# Patient Record
Sex: FEMALE | Race: WHITE | NOT HISPANIC OR LATINO
[De-identification: names, ages, dates, MRNs, and addresses within clinical notes are randomized per-mention and may not be internally consistent; named-entity substitution may affect disease eponyms.]

---

## 2019-01-17 ENCOUNTER — APPOINTMENT (OUTPATIENT)
Dept: ULTRASOUND IMAGING | Facility: CLINIC | Age: 79
End: 2019-01-17
Payer: MEDICARE

## 2019-01-17 ENCOUNTER — OUTPATIENT (OUTPATIENT)
Dept: OUTPATIENT SERVICES | Facility: HOSPITAL | Age: 79
LOS: 1 days | End: 2019-01-17
Payer: MEDICARE

## 2019-01-17 DIAGNOSIS — Z00.8 ENCOUNTER FOR OTHER GENERAL EXAMINATION: ICD-10-CM

## 2019-01-17 PROCEDURE — 93971 EXTREMITY STUDY: CPT | Mod: 26

## 2019-01-17 PROCEDURE — 93971 EXTREMITY STUDY: CPT

## 2019-12-06 ENCOUNTER — APPOINTMENT (OUTPATIENT)
Dept: OTOLARYNGOLOGY | Facility: CLINIC | Age: 79
End: 2019-12-06
Payer: MEDICARE

## 2019-12-06 VITALS
HEART RATE: 84 BPM | HEIGHT: 62.5 IN | SYSTOLIC BLOOD PRESSURE: 179 MMHG | BODY MASS INDEX: 34.99 KG/M2 | WEIGHT: 195 LBS | DIASTOLIC BLOOD PRESSURE: 99 MMHG

## 2019-12-06 DIAGNOSIS — E66.9 OBESITY, UNSPECIFIED: ICD-10-CM

## 2019-12-06 DIAGNOSIS — R42 DIZZINESS AND GIDDINESS: ICD-10-CM

## 2019-12-06 PROCEDURE — 69210 REMOVE IMPACTED EAR WAX UNI: CPT

## 2019-12-06 PROCEDURE — 99203 OFFICE O/P NEW LOW 30 MIN: CPT | Mod: 25

## 2019-12-06 NOTE — HISTORY OF PRESENT ILLNESS
[No] : patient does not have a  history of radiation therapy [de-identified] : 80 yo female\par chr h/o bilat hearing loss and tinnitus R>L x yrs- moderate\par also has past h/o vertigo\par no nasal or throat complaints\par no other modifying factors\par  [Anxiety] : no anxiety [Tinnitus] : tinnitus [Dizziness] : no dizziness [Headache] : no headache [Hearing Loss] : hearing loss [Vertigo] : no vertigo [Otalgia] : no otalgia [Eustachian Tube Dysfunction] : no eustachian tube dysfunction [Presbycusis] : presbycusis [Loud Noise Exposure] : no history of loud noise exposure [Stroke] : no stroke [Smoking] : no smoking [Early Onset Hearing Loss] : no early onset hearing loss [Facial Pain] : no facial pain [Facial Pressure] : no facial pressure [Nasal Congestion] : no nasal congestion [Ear Fullness] : no ear fullness [Allergic Rhinitis] : no allergic rhinitis [Septal Deviation] : no septal deviation [Environmental Allergies] : no environmental allergies [Seasonal Allergies] : no seasonal allergies [Environmental Allergens] : no environmental allergens [Allergies] : no allergies [Asthma] : no asthma [Neck Mass] : no neck mass [Neck Pain] : no neck pain [Chills] : no chills [Cold Intolerance] : no cold intolerance [Cough] : no cough [Fatigue] : no fatigue [Heat Intolerance] : no heat intolerance [Hyperthyroidism] : no hyperthyroidism [Sialadenitis] : no sialadenitis [Hodgkin Disease] : no hodgkin disease [Non-Hodgkin Lymphoma] : no non-hodgkin lymphoma [Graves Disease] : no graves disease [Alcohol Use] : no alcohol use [Tobacco Use] : no tobacco use [Thyroid Cancer] : no thyroid cancer

## 2019-12-06 NOTE — PHYSICAL EXAM
[de-identified] : bilat wax [] : septum deviated bilaterally [Midline] : trachea located in midline position [Normal] : salivary glands are normal

## 2019-12-06 NOTE — ASSESSMENT
[FreeTextEntry1] : wax-\par After informed verbal consent is obtained, cerumen is removed from the right and left  ear canal with a curette and suction.\par Rx: \par Debrox was prescribed and  is to be placed in both ears on a routine basis to keep them free of wax.\par Routine debridement suggested to keep the ears free of wax.\par \par bilateral sensorineural hearing loss-cleared for hearing aids\par

## 2020-06-18 ENCOUNTER — APPOINTMENT (OUTPATIENT)
Dept: OTOLARYNGOLOGY | Facility: CLINIC | Age: 80
End: 2020-06-18
Payer: MEDICARE

## 2020-06-18 VITALS
DIASTOLIC BLOOD PRESSURE: 94 MMHG | SYSTOLIC BLOOD PRESSURE: 174 MMHG | TEMPERATURE: 97.8 F | WEIGHT: 195 LBS | HEIGHT: 62.5 IN | HEART RATE: 92 BPM | BODY MASS INDEX: 34.99 KG/M2

## 2020-06-18 PROCEDURE — 99214 OFFICE O/P EST MOD 30 MIN: CPT | Mod: 25

## 2020-06-18 PROCEDURE — 69210 REMOVE IMPACTED EAR WAX UNI: CPT

## 2020-06-18 NOTE — ASSESSMENT
[FreeTextEntry1] : wax-\par After informed verbal consent is obtained, cerumen is removed from the right and left  ear canal with a curette and suction.\par Rx: \par Debrox was prescribed and  is to be placed in both ears on a routine basis to keep them free of wax.\par Routine debridement suggested to keep the ears free of wax.\par \par bilateral sensorineural hearing loss-cleared for hearing aids\par tinnitus resolved w/ wax removal\par \par f/u 6 months or prn

## 2020-06-18 NOTE — REASON FOR VISIT
[Hearing Loss] : hearing loss [Vertigo] : vertigo [Subsequent Evaluation] : a subsequent evaluation for

## 2020-06-18 NOTE — PHYSICAL EXAM
[] : septum deviated bilaterally [Midline] : trachea located in midline position [de-identified] : bilat wax [Normal] : no rashes

## 2020-06-18 NOTE — HISTORY OF PRESENT ILLNESS
[No] : patient does not have a  history of radiation therapy [Tinnitus] : tinnitus [Hearing Loss] : hearing loss [Presbycusis] : presbycusis [de-identified] : 78 yo female with chronic bilateral hearing loss and tinnitus R>L x years presents for routine wax cleaning. No change in hearing. Has hx of vertigo. \par Pt has no ear pain, ear drainage, vertigo, nasal congestion, nasal discharge, epistaxis, sinus infections, facial pain, facial pressure, throat pain, dysphagia or fevers\par \par \par  [Anxiety] : no anxiety [Dizziness] : no dizziness [Otalgia] : no otalgia [Headache] : no headache [Vertigo] : no vertigo [Loud Noise Exposure] : no history of loud noise exposure [Eustachian Tube Dysfunction] : no eustachian tube dysfunction [Smoking] : no smoking [Stroke] : no stroke [Early Onset Hearing Loss] : no early onset hearing loss [Facial Pressure] : no facial pressure [Nasal Congestion] : no nasal congestion [Facial Pain] : no facial pain [Ear Fullness] : no ear fullness [Allergic Rhinitis] : no allergic rhinitis [Septal Deviation] : no septal deviation [Environmental Allergies] : no environmental allergies [Environmental Allergens] : no environmental allergens [Seasonal Allergies] : no seasonal allergies [Allergies] : no allergies [Asthma] : no asthma [Neck Mass] : no neck mass [Neck Pain] : no neck pain [Chills] : no chills [Fatigue] : no fatigue [Cough] : no cough [Cold Intolerance] : no cold intolerance [Hyperthyroidism] : no hyperthyroidism [Heat Intolerance] : no heat intolerance [Hodgkin Disease] : no hodgkin disease [Sialadenitis] : no sialadenitis [Non-Hodgkin Lymphoma] : no non-hodgkin lymphoma [Tobacco Use] : no tobacco use [Alcohol Use] : no alcohol use [Graves Disease] : no graves disease [Thyroid Cancer] : no thyroid cancer

## 2022-03-11 DIAGNOSIS — Z01.812 ENCOUNTER FOR PREPROCEDURAL LABORATORY EXAMINATION: ICD-10-CM

## 2022-03-25 LAB — SARS-COV-2 N GENE NPH QL NAA+PROBE: NOT DETECTED

## 2022-03-29 ENCOUNTER — APPOINTMENT (OUTPATIENT)
Dept: PULMONOLOGY | Facility: CLINIC | Age: 82
End: 2022-03-29
Payer: MEDICARE

## 2022-03-29 VITALS
DIASTOLIC BLOOD PRESSURE: 81 MMHG | RESPIRATION RATE: 16 BRPM | TEMPERATURE: 98.1 F | HEIGHT: 62 IN | HEART RATE: 90 BPM | BODY MASS INDEX: 36.25 KG/M2 | WEIGHT: 197 LBS | SYSTOLIC BLOOD PRESSURE: 159 MMHG | OXYGEN SATURATION: 96 %

## 2022-03-29 DIAGNOSIS — J45.909 UNSPECIFIED ASTHMA, UNCOMPLICATED: ICD-10-CM

## 2022-03-29 DIAGNOSIS — R05.9 COUGH, UNSPECIFIED: ICD-10-CM

## 2022-03-29 LAB — POCT - HEMOGLOBIN (HGB), QUANTITATIVE, TRANSCUTANEOUS: 12.5

## 2022-03-29 PROCEDURE — 71046 X-RAY EXAM CHEST 2 VIEWS: CPT

## 2022-03-29 PROCEDURE — 99204 OFFICE O/P NEW MOD 45 MIN: CPT | Mod: 25

## 2022-03-29 PROCEDURE — 94729 DIFFUSING CAPACITY: CPT

## 2022-03-29 PROCEDURE — 94727 GAS DIL/WSHOT DETER LNG VOL: CPT

## 2022-03-29 PROCEDURE — ZZZZZ: CPT

## 2022-03-29 PROCEDURE — 94060 EVALUATION OF WHEEZING: CPT

## 2022-03-29 PROCEDURE — 88738 HGB QUANT TRANSCUTANEOUS: CPT

## 2022-03-29 RX ORDER — ALBUTEROL SULFATE 90 UG/1
108 (90 BASE) POWDER, METERED RESPIRATORY (INHALATION) EVERY 6 HOURS
Qty: 1 | Refills: 3 | Status: ACTIVE | COMMUNITY
Start: 2022-03-29 | End: 1900-01-01

## 2022-03-29 RX ORDER — AMLODIPINE BESYLATE 5 MG/1
TABLET ORAL
Refills: 0 | Status: ACTIVE | COMMUNITY

## 2022-03-29 RX ORDER — ATORVASTATIN CALCIUM 80 MG/1
TABLET, FILM COATED ORAL
Refills: 0 | Status: ACTIVE | COMMUNITY

## 2022-03-29 RX ORDER — BECLOMETHASONE DIPROPIONATE HFA 40 UG/1
40 AEROSOL, METERED RESPIRATORY (INHALATION)
Qty: 1 | Refills: 5 | Status: ACTIVE | COMMUNITY
Start: 2022-03-29 | End: 1900-01-01

## 2022-03-29 RX ORDER — BECLOMETHASONE DIPROPIONATE 40 UG/1
40 AEROSOL, METERED RESPIRATORY (INHALATION)
Refills: 0 | Status: ACTIVE | COMMUNITY

## 2022-03-29 RX ORDER — AZILSARTAN KAMEDOXOMIL 40 MG/1
40 TABLET ORAL
Refills: 0 | Status: ACTIVE | COMMUNITY

## 2022-03-29 NOTE — HISTORY OF PRESENT ILLNESS
[Former] : former [Never] : never [TextBox_4] : Referred for evaluation of cough\par Worsening cough past several months did not change after blood pressure medication change history of asthma currently on Qvar does report some nasal drip symptoms reports chronic poor sleep with chronic insomnia.  Denies snoring.  Does have history of frequent UTIs and often takes nitrofurantoin but does not associate cough with this medication\par \par \par \par \par \par \par \par  [YearQuit] : 1970

## 2022-03-29 NOTE — PROCEDURE
[FreeTextEntry1] : PFTs demonstrate obstructive pattern nonspecific bronchodilator response normal to increased lung volumes normal diffusing capacity chest x-ray clear\par \par

## 2022-03-29 NOTE — ASSESSMENT
[FreeTextEntry1] : Cough appears to be related to undertreated asthma.  While her PFT is normal her NIOX is elevated she is only taking a minimal dose of Qvar increased Qvar dose to 2 puffs twice daily and prescribed rescue inhaler.  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

## 2022-04-04 ENCOUNTER — APPOINTMENT (OUTPATIENT)
Dept: MAMMOGRAPHY | Facility: CLINIC | Age: 82
End: 2022-04-04
Payer: MEDICARE

## 2022-04-04 PROCEDURE — 77063 BREAST TOMOSYNTHESIS BI: CPT

## 2022-04-04 PROCEDURE — 77067 SCR MAMMO BI INCL CAD: CPT

## 2022-05-10 ENCOUNTER — APPOINTMENT (OUTPATIENT)
Dept: PULMONOLOGY | Facility: CLINIC | Age: 82
End: 2022-05-10

## 2022-06-02 ENCOUNTER — OUTPATIENT (OUTPATIENT)
Dept: OUTPATIENT SERVICES | Facility: HOSPITAL | Age: 82
LOS: 1 days | End: 2022-06-02
Payer: MEDICARE

## 2022-06-02 ENCOUNTER — APPOINTMENT (OUTPATIENT)
Dept: ULTRASOUND IMAGING | Facility: IMAGING CENTER | Age: 82
End: 2022-06-02
Payer: MEDICARE

## 2022-06-02 DIAGNOSIS — Z00.8 ENCOUNTER FOR OTHER GENERAL EXAMINATION: ICD-10-CM

## 2022-06-02 PROCEDURE — 93971 EXTREMITY STUDY: CPT

## 2022-06-02 PROCEDURE — 93971 EXTREMITY STUDY: CPT | Mod: 26,RT

## 2022-09-12 ENCOUNTER — APPOINTMENT (OUTPATIENT)
Dept: OTOLARYNGOLOGY | Facility: CLINIC | Age: 82
End: 2022-09-12

## 2022-09-12 VITALS
WEIGHT: 195 LBS | BODY MASS INDEX: 35.88 KG/M2 | SYSTOLIC BLOOD PRESSURE: 147 MMHG | DIASTOLIC BLOOD PRESSURE: 86 MMHG | HEART RATE: 80 BPM | TEMPERATURE: 97.5 F | HEIGHT: 62 IN

## 2022-09-12 DIAGNOSIS — H61.23 IMPACTED CERUMEN, BILATERAL: ICD-10-CM

## 2022-09-12 DIAGNOSIS — H90.3 SENSORINEURAL HEARING LOSS, BILATERAL: ICD-10-CM

## 2022-09-12 DIAGNOSIS — H93.11 TINNITUS, RIGHT EAR: ICD-10-CM

## 2022-09-12 PROCEDURE — 69210 REMOVE IMPACTED EAR WAX UNI: CPT

## 2022-09-12 PROCEDURE — 99213 OFFICE O/P EST LOW 20 MIN: CPT | Mod: 25

## 2022-09-12 NOTE — PHYSICAL EXAM
[Normal] : mucosa is normal [Midline] : trachea located in midline position [de-identified] : b/l wax

## 2022-09-12 NOTE — ASSESSMENT
[FreeTextEntry1] : Ms. TOM 81 year F with hx of hearing loss and tinnitus presents for routine ear cleaning \par \par Wax\par -Cerumen is removed from the right and left  ear canal with a curette and suction.\par -Rx:Debrox be placed in both ears on a routine basis to keep them free of wax.\par -Routine debridement suggested to keep the ears free of wax.\par \par \par f/u 1 year or prn

## 2022-09-12 NOTE — HISTORY OF PRESENT ILLNESS
[de-identified] : 82 yo female\par Patient with hx of hearing loss and tinnitus presents for routine ear cleaning. States there is no change in hearing since last visit. She is otherwise doing okay. No other modifying factors\par \par

## 2022-09-12 NOTE — END OF VISIT
[FreeTextEntry3] : I personally saw and examined MARKOS TOM in detail. I spoke to CHRISTOPHER Gamez regarding the assessment and plan of care. I reviewed the above assessment and plan of care, and agree. I have made changes in changes in the body of the note where appropriate.I personally reviewed the HPI, PMH, FH, SH, ROS and medications/allergies. I have spoken to CHRISTOPHER Gamez regarding the history and have personally determined the assessment and plan of care, and documented this myself. I was present and participated in all key portions of the encounter and all procedures noted above. I have made changes in the body of the note where appropriate.\par \par Attesting Faculty: See Attending Signature Below \par \par \par

## 2024-12-26 ENCOUNTER — NON-APPOINTMENT (OUTPATIENT)
Age: 84
End: 2024-12-26

## 2024-12-26 DIAGNOSIS — I83.813 VARICOSE VEINS OF BILATERAL LOWER EXTREMITIES WITH PAIN: ICD-10-CM

## 2024-12-26 DIAGNOSIS — Z87.891 PERSONAL HISTORY OF NICOTINE DEPENDENCE: ICD-10-CM

## 2024-12-26 DIAGNOSIS — Z92.89 PERSONAL HISTORY OF OTHER MEDICAL TREATMENT: ICD-10-CM

## 2024-12-26 DIAGNOSIS — Z82.49 FAMILY HISTORY OF ISCHEMIC HEART DISEASE AND OTHER DISEASES OF THE CIRCULATORY SYSTEM: ICD-10-CM

## 2024-12-26 DIAGNOSIS — M79.89 OTHER SPECIFIED SOFT TISSUE DISORDERS: ICD-10-CM
